# Patient Record
Sex: FEMALE | Race: OTHER | Employment: OTHER | URBAN - METROPOLITAN AREA
[De-identification: names, ages, dates, MRNs, and addresses within clinical notes are randomized per-mention and may not be internally consistent; named-entity substitution may affect disease eponyms.]

---

## 2018-05-15 ENCOUNTER — HOSPITAL ENCOUNTER (OUTPATIENT)
Dept: PHYSICAL THERAPY | Facility: HOSPITAL | Age: 33
Setting detail: THERAPIES SERIES
Discharge: HOME | End: 2018-05-15
Attending: PHYSICAL MEDICINE & REHABILITATION
Payer: MEDICARE

## 2018-05-15 DIAGNOSIS — R26.9 UNSPECIFIED ABNORMALITIES OF GAIT AND MOBILITY: ICD-10-CM

## 2018-05-15 DIAGNOSIS — G80.9 CEREBRAL PALSY (HCC): ICD-10-CM

## 2018-05-15 DIAGNOSIS — R25.2 CRAMP AND SPASM: ICD-10-CM

## 2018-05-15 PROCEDURE — 97163 PT EVAL HIGH COMPLEX 45 MIN: CPT

## 2018-05-15 PROCEDURE — 97110 THERAPEUTIC EXERCISES: CPT

## 2018-05-16 ENCOUNTER — OFFICE VISIT (OUTPATIENT)
Dept: NUTRITION | Facility: HOSPITAL | Age: 33
End: 2018-05-16
Payer: MEDICARE

## 2018-05-16 PROCEDURE — 97802 MEDICAL NUTRITION INDIV IN: CPT

## 2018-05-16 NOTE — PROGRESS NOTES
ADULT INITIAL OUTPATIENT NUTRITION CONSULTATION    Nutrition Assessment    Medical Diagnosis: Hyperlipidemia, obesity    PMH: CP  Family Hx of DM, CA, and heart disease, per pt    Physical Findings: pt in a motorized chair    Client Hx: 28year old femal today, track intake using phone charis, and contact RD with further questions or concerns. Monitoring/Evaluation:  Pt to F/U with MD. Suggest F/U with RD in 6-8 weeks.       Time spent with patient: 60 minutes    Thank you for the referral,    Beth Drummond

## 2018-05-21 ENCOUNTER — APPOINTMENT (OUTPATIENT)
Dept: PHYSICAL THERAPY | Facility: HOSPITAL | Age: 33
End: 2018-05-21
Attending: PHYSICAL MEDICINE & REHABILITATION
Payer: MEDICARE

## 2018-05-21 NOTE — PROGRESS NOTES
NEUROLOGICAL EVALUATION:   Referring Physician: Dr. Jose David Washington  Diagnosis: Cerebral Palsy    Date of Service: 5/15/2018     PATIENT SUMMARY   Karma Walden is a 28year old y/o female who presents to therapy today with complaints of stiffness and impaire years has had a decline in overall functional mobility. Pt goals include improve ability to walk, stand, improve balance, be more independent with functional mobility. Past medical history was reviewed with Tomasa Bustos.  Significant findings include CP, rhiz Supination       External Rot -5       Wrist / Hand   2- 3+ Knee         Flexion       Flexion         Extension -20      Extension -25 -5        Strength     Foot / Ankle              DF -50/-10 0     core 2/5      PF 40 50       Mobility / Transfers or a total of 12 visits over a 90 day period. Treatment will include: Neuromuscular Re-education; Therapeutic Activity; Gait Training;  Therapeutic Exercises; Manual Therapy; modalities prn, transfers, bed mobility, wheelchair mobility, and progressive HEP

## 2018-05-30 ENCOUNTER — HOSPITAL ENCOUNTER (OUTPATIENT)
Dept: PHYSICAL THERAPY | Facility: HOSPITAL | Age: 33
Setting detail: THERAPIES SERIES
Discharge: HOME | End: 2018-05-30
Attending: PHYSICAL MEDICINE & REHABILITATION
Payer: MEDICARE

## 2018-05-30 PROCEDURE — 97140 MANUAL THERAPY 1/> REGIONS: CPT

## 2018-05-30 PROCEDURE — 97112 NEUROMUSCULAR REEDUCATION: CPT

## 2018-05-30 PROCEDURE — 97110 THERAPEUTIC EXERCISES: CPT

## 2018-05-30 NOTE — PROGRESS NOTES
Dx: Cerebral Palsy; Unspecified abnormalities of gait and mobility (R26.9)  Cramp and spasm         Authorized # of Visits:  Medicare 12 visits        Next MD visit: none scheduled  Fall Risk: high        Precautions: n/a             Subjective: Pt. Reports standing/walking up to 5 minutes with AFOs and reverse walker with improved afua and mechanics. 4. Patient to improve core strength by 1/2 grade to promote improved posture and static balance.     5. Patient to perform wheel-chair transfers with modif

## 2018-06-04 ENCOUNTER — APPOINTMENT (OUTPATIENT)
Dept: PHYSICAL THERAPY | Facility: HOSPITAL | Age: 33
End: 2018-06-04
Attending: PHYSICAL MEDICINE & REHABILITATION
Payer: MEDICARE

## 2018-06-05 ENCOUNTER — HOSPITAL ENCOUNTER (OUTPATIENT)
Dept: PHYSICAL THERAPY | Facility: HOSPITAL | Age: 33
Setting detail: THERAPIES SERIES
Discharge: HOME | End: 2018-06-05
Attending: PHYSICAL MEDICINE & REHABILITATION
Payer: MEDICARE

## 2018-06-05 PROCEDURE — 97112 NEUROMUSCULAR REEDUCATION: CPT

## 2018-06-05 PROCEDURE — 97110 THERAPEUTIC EXERCISES: CPT

## 2018-06-05 PROCEDURE — 97140 MANUAL THERAPY 1/> REGIONS: CPT

## 2018-06-05 NOTE — PROGRESS NOTES
Dx: Cerebral Palsy; Unspecified abnormalities of gait and mobility (R26.9)  Cramp and spasm         Authorized # of Visits:  Medicare 12 visits        Next MD visit: none scheduled  Fall Risk: high        Precautions: n/a             Subjective: Was able to Code: Mobility: Walking and Moving Around CL: 60-79% impaired, limited, or restricted    Plan: Continue to progress with emphasis on B LE stretching and R UE stretching. Work on seated balance and bed mobility and core stabilization as tolerated.      Date:

## 2018-06-06 ENCOUNTER — APPOINTMENT (OUTPATIENT)
Dept: PHYSICAL THERAPY | Facility: HOSPITAL | Age: 33
End: 2018-06-06
Attending: PHYSICAL MEDICINE & REHABILITATION
Payer: MEDICARE

## 2018-06-07 ENCOUNTER — HOSPITAL ENCOUNTER (OUTPATIENT)
Dept: PHYSICAL THERAPY | Facility: HOSPITAL | Age: 33
Setting detail: THERAPIES SERIES
Discharge: HOME | End: 2018-06-07
Attending: PHYSICAL MEDICINE & REHABILITATION
Payer: MEDICARE

## 2018-06-07 PROCEDURE — 97140 MANUAL THERAPY 1/> REGIONS: CPT

## 2018-06-07 PROCEDURE — 97112 NEUROMUSCULAR REEDUCATION: CPT

## 2018-06-07 PROCEDURE — 97110 THERAPEUTIC EXERCISES: CPT

## 2018-06-07 NOTE — PROGRESS NOTES
Dx: Cerebral Palsy; Unspecified abnormalities of gait and mobility (R26.9)  Cramp and spasm         Authorized # of Visits:  Medicare 12 visits        Next MD visit: none scheduled  Fall Risk: high        Precautions: n/a             Subjective: Pt. Present restricted    Plan: Continue to progress with emphasis on B LE stretching and R UE stretching. Work on seated balance with catch/hitting ball back and forth and core stabilization as tolerated.      Date: 5/30/2018  Tx#: 2/12 Date: 6/5/18  Tx#: 3/12 Date: 6

## 2018-06-11 ENCOUNTER — HOSPITAL ENCOUNTER (OUTPATIENT)
Dept: PHYSICAL THERAPY | Facility: HOSPITAL | Age: 33
Setting detail: THERAPIES SERIES
Discharge: HOME | End: 2018-06-11
Attending: PHYSICAL MEDICINE & REHABILITATION
Payer: MEDICARE

## 2018-06-11 PROCEDURE — 97530 THERAPEUTIC ACTIVITIES: CPT

## 2018-06-11 PROCEDURE — 97110 THERAPEUTIC EXERCISES: CPT

## 2018-06-11 PROCEDURE — 97112 NEUROMUSCULAR REEDUCATION: CPT

## 2018-06-11 NOTE — PROGRESS NOTES
Dx: Cerebral Palsy; Unspecified abnormalities of gait and mobility (R26.9)  Cramp and spasm         Authorized # of Visits:  Medicare 12 visits        Next MD visit: none scheduled  Fall Risk: high        Precautions: n/a             Subjective: Pt reports and Moving Around CL: 60-79% impaired, limited, or restricted    Plan: Continue to progress with emphasis on B LE stretching and R UE stretching. Work on seated balance with catch/hitting ball back and forth and core stabilization as tolerated.      Date: 5 x 10  DKTC c SB x 10 LTR c SB x 10  DKTC c SB x 10 LTR c SB x 10  DKTC c SB x 10      Passive stretching B LEs Passive stretching B LEs Passive stretching B LEs Passive stretching B LEs x 8mins      Passive stretching R UE Passive stretching R UE Passive s

## 2018-06-13 ENCOUNTER — APPOINTMENT (OUTPATIENT)
Dept: PHYSICAL THERAPY | Facility: HOSPITAL | Age: 33
End: 2018-06-13
Attending: PHYSICAL MEDICINE & REHABILITATION
Payer: MEDICARE

## 2018-06-18 ENCOUNTER — APPOINTMENT (OUTPATIENT)
Dept: PHYSICAL THERAPY | Facility: HOSPITAL | Age: 33
End: 2018-06-18
Attending: PHYSICAL MEDICINE & REHABILITATION
Payer: MEDICARE

## 2018-06-19 ENCOUNTER — HOSPITAL ENCOUNTER (OUTPATIENT)
Dept: PHYSICAL THERAPY | Facility: HOSPITAL | Age: 33
Setting detail: THERAPIES SERIES
Discharge: HOME | End: 2018-06-19
Attending: PHYSICAL MEDICINE & REHABILITATION
Payer: MEDICARE

## 2018-06-19 PROCEDURE — 97112 NEUROMUSCULAR REEDUCATION: CPT

## 2018-06-19 PROCEDURE — 97140 MANUAL THERAPY 1/> REGIONS: CPT

## 2018-06-20 NOTE — PROGRESS NOTES
Dx: Cerebral Palsy; Unspecified abnormalities of gait and mobility (R26.9)  Cramp and spasm         Authorized # of Visits:  Medicare 12 visits        Next MD visit: none scheduled  Fall Risk: high        Precautions: n/a             Subjective: Pt reports 5/30/2018  Tx#: 2/12 Date: 6/5/18  Tx#: 3/12 Date: 6/7/18  Tx#: 4/12 Date: 6/11/18   Tx#: 5/12 Date: 6/20/18  Tx#: 6/12 Date: Tx#: 7/ Date:    Tx#: 8/   Gait assessment with AFOs/walker scifit L UE and L LE only x 4 min with Max A to perform 1/2 revoluati Ball squeeze 3 sec hold x10 (manual hold at R ankle) Bridge with SB x 15     Supine-sit x 4 LTR c SB x 10  DKTC c SB x 10 LTR c SB x 10  DKTC c SB x 10 LTR c SB x 10  DKTC c SB x 10 LTR c SB x 10  DKTC c SB x 10     Passive stretching B LEs Passive stretch

## 2018-06-21 ENCOUNTER — APPOINTMENT (OUTPATIENT)
Dept: PHYSICAL THERAPY | Facility: HOSPITAL | Age: 33
End: 2018-06-21
Attending: PHYSICAL MEDICINE & REHABILITATION
Payer: MEDICARE

## 2018-06-27 ENCOUNTER — APPOINTMENT (OUTPATIENT)
Dept: PHYSICAL THERAPY | Facility: HOSPITAL | Age: 33
End: 2018-06-27
Attending: PHYSICAL MEDICINE & REHABILITATION
Payer: MEDICARE

## 2018-07-03 ENCOUNTER — APPOINTMENT (OUTPATIENT)
Dept: PHYSICAL THERAPY | Facility: HOSPITAL | Age: 33
End: 2018-07-03
Attending: PHYSICAL MEDICINE & REHABILITATION
Payer: MEDICARE

## 2018-07-05 ENCOUNTER — HOSPITAL ENCOUNTER (OUTPATIENT)
Dept: PHYSICAL THERAPY | Facility: HOSPITAL | Age: 33
Setting detail: THERAPIES SERIES
Discharge: HOME | End: 2018-07-05
Attending: PHYSICAL MEDICINE & REHABILITATION
Payer: MEDICARE

## 2018-07-05 PROCEDURE — 97140 MANUAL THERAPY 1/> REGIONS: CPT

## 2018-07-05 PROCEDURE — 97110 THERAPEUTIC EXERCISES: CPT

## 2018-07-05 NOTE — PROGRESS NOTES
Dx: Cerebral Palsy; Unspecified abnormalities of gait and mobility (R26.9)  Cramp and spasm         Authorized # of Visits:  Medicare 12 visits        Next MD visit: none scheduled  Fall Risk: high        Precautions: n/a             Subjective: Pt reports stabilization as tolerated. Try seated on SB next session. Date: 5/30/2018  Tx#: 2/12 Date: 6/5/18  Tx#: 3/12 Date: 6/7/18  Tx#: 4/12 Date: 6/11/18   Tx#: 5/12 Date: 6/20/18  Tx#: 6/12 Date: 7/5/18  Tx#: 7/12 Date:    Tx#: 8/   Gait assessment with AFOs x 10      Sit-stand x 10 Bridge with ball squeeze x 1 Bridge with SB x 10 Hooklying Bridge with  Ball squeeze 3 sec hold x10 (manual hold at R ankle) Bridge with SB x 15     Supine-sit x 4 LTR c SB x 10  DKTC c SB x 10 LTR c SB x 10  DKTC c SB x 10 LTR c S

## 2018-07-10 ENCOUNTER — HOSPITAL ENCOUNTER (OUTPATIENT)
Dept: PHYSICAL THERAPY | Facility: HOSPITAL | Age: 33
Setting detail: THERAPIES SERIES
Discharge: HOME | End: 2018-07-10
Attending: PHYSICAL MEDICINE & REHABILITATION
Payer: MEDICARE

## 2018-07-10 PROCEDURE — 97110 THERAPEUTIC EXERCISES: CPT

## 2018-07-10 PROCEDURE — 97140 MANUAL THERAPY 1/> REGIONS: CPT

## 2018-07-10 NOTE — PROGRESS NOTES
Dx: Cerebral Palsy; Unspecified abnormalities of gait and mobility (R26.9)  Cramp and spasm         Authorized # of Visits:  Medicare 12 visits        Next MD visit: none scheduled  Fall Risk: high        Precautions: n/a             Subjective: Pt reports injections. Work on seated balance with catch/hitting ball back and forth and core stabilization as tolerated. Try seated on SB next session.     Date: 5/30/2018  Tx#: 2/12 Date: 6/5/18  Tx#: 3/12 Date: 6/7/18  Tx#: 4/12 Date: 6/11/18   Tx#: 5/12 Date: 6/2 10  *diagonal squeeze x 10 each Seated with ball under feet, press into ground x 10 Seated with ball under feet, press 5 sec hold into ground x 10      Sit-stand x 10 Bridge with ball squeeze x 1 Bridge with SB x 10 Hooklying Bridge with  Ball squeeze 3 se

## 2018-07-12 ENCOUNTER — HOSPITAL ENCOUNTER (OUTPATIENT)
Dept: PHYSICAL THERAPY | Facility: HOSPITAL | Age: 33
Setting detail: THERAPIES SERIES
Discharge: HOME | End: 2018-07-12
Attending: PHYSICAL MEDICINE & REHABILITATION
Payer: MEDICARE

## 2018-07-12 PROCEDURE — 97110 THERAPEUTIC EXERCISES: CPT

## 2018-07-12 PROCEDURE — 97140 MANUAL THERAPY 1/> REGIONS: CPT

## 2018-07-12 NOTE — PROGRESS NOTES
Dx: Cerebral Palsy; Unspecified abnormalities of gait and mobility (R26.9)  Cramp and spasm         Authorized # of Visits:  Medicare 12 visits        Next MD visit: none scheduled  Fall Risk: high        Precautions: n/a             Subjective: Was not abl recent botox injections. Work on seated balance with catch/hitting ball back and forth and core stabilization as tolerated. Try seated on SB next session.     Date: 5/30/2018  Tx#: 2/12 Date: 6/5/18  Tx#: 3/12 Date: 6/7/18  Tx#: 4/12 Date: 6/11/18   Tx#: 5 adduction squeeze 5 sec hold with ball x 10       hooklying with SB mass flex abdominal iso x 10  *diagonal squeeze x 10 each Seated with ball under feet, press into ground x 10 Seated with ball under feet, press 5 sec hold into ground x 10       Sit-stand

## 2018-07-17 ENCOUNTER — HOSPITAL ENCOUNTER (OUTPATIENT)
Dept: PHYSICAL THERAPY | Facility: HOSPITAL | Age: 33
Setting detail: THERAPIES SERIES
Discharge: HOME | End: 2018-07-17
Attending: PHYSICAL MEDICINE & REHABILITATION
Payer: MEDICARE

## 2018-07-17 PROCEDURE — 97110 THERAPEUTIC EXERCISES: CPT

## 2018-07-17 PROCEDURE — 97140 MANUAL THERAPY 1/> REGIONS: CPT

## 2018-07-17 NOTE — PROGRESS NOTES
Dx: Cerebral Palsy; Unspecified abnormalities of gait and mobility (R26.9)  Cramp and spasm         Authorized # of Visits:  Medicare 12 visits        Next MD visit: none scheduled  Fall Risk: high        Precautions: n/a             Subjective: Did not get Reassess next visit to send update to MD. Work on seated balance with catch/hitting ball back and forth and core stabilization as tolerated. Try seated on SB next session.     Date: 5/30/2018  Tx#: 2/12 Date: 6/5/18  Tx#: 3/12 Date: 6/7/18  Tx#: 4/12 Date: ball x 10 Seated hip adduction squeeze 5 sec hold with ball x 10 Seated on foam airex  hip adduction squeeze 5 sec hold with ball x 10        hooklying with SB mass flex abdominal iso x 10  *diagonal squeeze x 10 each Seated with ball under feet, press int

## 2018-07-19 ENCOUNTER — HOSPITAL ENCOUNTER (OUTPATIENT)
Dept: PHYSICAL THERAPY | Facility: HOSPITAL | Age: 33
Setting detail: THERAPIES SERIES
Discharge: HOME | End: 2018-07-19
Attending: PHYSICAL MEDICINE & REHABILITATION
Payer: MEDICARE

## 2018-07-19 PROCEDURE — 97140 MANUAL THERAPY 1/> REGIONS: CPT

## 2018-07-19 PROCEDURE — 97110 THERAPEUTIC EXERCISES: CPT

## 2018-07-19 NOTE — PROGRESS NOTES
Dx: Cerebral Palsy; Unspecified abnormalities of gait and mobility (R26.9)  Cramp and spasm         Authorized # of Visits:  Medicare 12 visits        Next MD visit: none scheduled  Fall Risk: high        Precautions: n/a             Subjective: Setup ortho with catch/hitting ball back and forth and core stabilization as tolerated. Try seated on SB next session.     Date: 5/30/2018  Tx#: 2/12 Date: 6/5/18  Tx#: 3/12 Date: 6/7/18  Tx#: 4/12 Date: 6/11/18   Tx#: 5/12 Date: 6/20/18  Tx#: 6/12 Date: 7/5/18  Tx#: Seated hip adduction squeeze 5 sec hold with ball x 10 Seated on foam airex  hip adduction squeeze 5 sec hold with ball x 10         hooklying with SB mass flex abdominal iso x 10  *diagonal squeeze x 10 each Seated with ball under feet, press into ground

## 2018-07-26 ENCOUNTER — HOSPITAL ENCOUNTER (OUTPATIENT)
Dept: PHYSICAL THERAPY | Facility: HOSPITAL | Age: 33
Setting detail: THERAPIES SERIES
Discharge: HOME | End: 2018-07-26
Attending: PHYSICAL MEDICINE & REHABILITATION
Payer: MEDICARE

## 2018-07-26 PROCEDURE — 97140 MANUAL THERAPY 1/> REGIONS: CPT

## 2018-07-26 PROCEDURE — 97530 THERAPEUTIC ACTIVITIES: CPT

## 2018-07-26 PROCEDURE — 97112 NEUROMUSCULAR REEDUCATION: CPT

## 2018-07-26 NOTE — PROGRESS NOTES
Dear Mc Reynolds MD, Dr. Gillian Osman    This letter is to inform you of Stormy Cordova in Physical Therapy at [facility].     Dx: cerebral palsy        Treatment # __12__ of __12__ -40/-8 0     core 2/5      PF 50 50       Mobility / Transfers Level of Assistance   Bed Mobility SBA   Supine --> Sit SBA   Sit --> Supine SBA   Sit --> Stand Performs sit-kneel to walk and transfer with SBA; unable to perform sit-stand   Stand --> Sit Pe weeks for a total of 24 visits to progress toward above unmet goals. Treatment has focused on aggressive stretching and transfer training at this point.  Pt. Will be having AFOs adjusted and seeking orthopedic consult for possible UE and LE surgery to help

## 2018-07-31 ENCOUNTER — HOSPITAL ENCOUNTER (OUTPATIENT)
Dept: PHYSICAL THERAPY | Facility: HOSPITAL | Age: 33
Setting detail: THERAPIES SERIES
Discharge: HOME | End: 2018-07-31
Attending: PHYSICAL MEDICINE & REHABILITATION
Payer: MEDICARE

## 2018-07-31 PROCEDURE — 97110 THERAPEUTIC EXERCISES: CPT

## 2018-07-31 PROCEDURE — 97140 MANUAL THERAPY 1/> REGIONS: CPT

## 2018-08-01 NOTE — PROGRESS NOTES
Dx: Cerebral Palsy; Unspecified abnormalities of gait and mobility (R26.9)  Cramp and spasm         Authorized # of Visits:  Medicare 12 visits        Next MD visit: none scheduled  Fall Risk: high        Precautions: n/a             Subjective:Has not been ball back and forth and core stabilization as tolerated. Try seated on SB next session.     Date: 5/30/2018  Tx#: 2/12 Date: 6/5/18  Tx#: 3/12 Date: 6/7/18  Tx#: 4/12 Date: 6/11/18   Tx#: 5/12 Date: 6/20/18  Tx#: 6/12 Date: 7/5/18  Tx#: 7/12 Date:  7/10/18 ball x 10 Seated hip adduction squeeze with ball x 10 Seated hip adduction squeeze 5 sec hold with ball x 10 Seated on foam airex  hip adduction squeeze 5 sec hold with ball x 10          hooklying with SB mass flex abdominal iso x 10  *diagonal squeeze x

## 2018-08-08 ENCOUNTER — APPOINTMENT (OUTPATIENT)
Dept: PHYSICAL THERAPY | Facility: HOSPITAL | Age: 33
End: 2018-08-08
Payer: MEDICARE

## 2018-08-21 ENCOUNTER — APPOINTMENT (OUTPATIENT)
Dept: PHYSICAL THERAPY | Facility: HOSPITAL | Age: 33
End: 2018-08-21
Payer: MEDICARE

## 2018-08-22 ENCOUNTER — APPOINTMENT (OUTPATIENT)
Dept: PHYSICAL THERAPY | Facility: HOSPITAL | Age: 33
End: 2018-08-22
Payer: MEDICARE

## 2018-08-28 ENCOUNTER — APPOINTMENT (OUTPATIENT)
Dept: PHYSICAL THERAPY | Facility: HOSPITAL | Age: 33
End: 2018-08-28
Payer: MEDICARE

## 2018-09-05 ENCOUNTER — HOSPITAL ENCOUNTER (OUTPATIENT)
Dept: PHYSICAL THERAPY | Facility: HOSPITAL | Age: 33
Setting detail: THERAPIES SERIES
Discharge: HOME | End: 2018-09-05
Attending: INTERNAL MEDICINE
Payer: MEDICARE

## 2018-09-05 PROCEDURE — 97140 MANUAL THERAPY 1/> REGIONS: CPT

## 2018-09-05 PROCEDURE — 97110 THERAPEUTIC EXERCISES: CPT

## 2018-09-05 NOTE — PROGRESS NOTES
Dx: Cerebral Palsy; Unspecified abnormalities of gait and mobility (R26.9)  Cramp and spasm         Authorized # of Visits:  Medicare 12 visits        Next MD visit: none scheduled  Fall Risk: high        Precautions: n/a             Subjective:Pt.  Was on v and R UE stretching with recent botox injections. Work on seated balance with catch/hitting ball back and forth and core stabilization as tolerated. Try seated on SB next session.     Date: 5/30/2018  Tx#: 2/12 Date: 6/5/18  Tx#: 3/12 Date: 6/7/18  Tx#: 4 10          Seated row red t-band x 10 Seated row red t-band x 10  Seated row red t-band x 10 (mostly LUE) Seated on foam airex red t-band row          Seated extension red band L UE x 10 Seated hip adduction squeeze with ball x 10 Seated hip adduction squ stretching R UE x2mins Passive stretching R UE Passive stretching R UE and L LE Passive stretching R UE and L LE Passive stretching R UE and L LE Passive stretching R UE and L LE Passive stretching R UE and L LE Passive stretching R UE and L LE Passive str

## 2018-09-11 ENCOUNTER — APPOINTMENT (OUTPATIENT)
Dept: PHYSICAL THERAPY | Facility: HOSPITAL | Age: 33
End: 2018-09-11
Payer: MEDICARE

## 2018-09-19 ENCOUNTER — HOSPITAL ENCOUNTER (OUTPATIENT)
Dept: PHYSICAL THERAPY | Facility: HOSPITAL | Age: 33
Setting detail: THERAPIES SERIES
Discharge: HOME | End: 2018-09-19
Attending: INTERNAL MEDICINE
Payer: MEDICARE

## 2018-09-19 PROCEDURE — 97530 THERAPEUTIC ACTIVITIES: CPT

## 2018-09-19 PROCEDURE — 97140 MANUAL THERAPY 1/> REGIONS: CPT

## 2018-09-19 PROCEDURE — 97110 THERAPEUTIC EXERCISES: CPT

## 2018-09-19 NOTE — PROGRESS NOTES
Dx: Cerebral Palsy; Unspecified abnormalities of gait and mobility (R26.9)  Cramp and spasm         Authorized # of Visits:  Medicare 12 visits        Next MD visit: none scheduled  Fall Risk: high        Precautions: n/a             Subjective:States she h 5/30/2018  Tx#: 2/12 Date: 6/5/18  Tx#: 3/12 Date: 6/7/18  Tx#: 4/12 Date: 6/11/18   Tx#: 5/12 Date: 6/20/18  Tx#: 6/12 Date: 7/5/18  Tx#: 7/12 Date:  7/10/18  Tx#: 8/12 Date: 7/12/18  Tx: 9/12 Date: 7/17/18  Tx: 10/12 Date: 7/19/18  Tx: 11/12 Date: 7/31/1 t-band x 10  Seated row red t-band x 10 (mostly LUE) Seated on foam airex red t-band row           Seated extension red band L UE x 10 Seated hip adduction squeeze with ball x 10 Seated hip adduction squeeze with ball x 10 Seated hip adduction squeeze 5 se supine, sidelying,and prone   Passive stretching R UE Passive stretching R UE Passive stretching R UE Passive stretching R UE x2mins Passive stretching R UE Passive stretching R UE and L LE Passive stretching R UE and L LE Passive stretching R UE and L LE

## 2018-10-10 ENCOUNTER — APPOINTMENT (OUTPATIENT)
Dept: PHYSICAL THERAPY | Facility: HOSPITAL | Age: 33
End: 2018-10-10
Attending: INTERNAL MEDICINE
Payer: MEDICARE

## 2018-10-16 ENCOUNTER — HOSPITAL ENCOUNTER (OUTPATIENT)
Dept: PHYSICAL THERAPY | Facility: HOSPITAL | Age: 33
Setting detail: THERAPIES SERIES
Discharge: HOME | End: 2018-10-16
Attending: INTERNAL MEDICINE
Payer: MEDICARE

## 2018-10-16 PROCEDURE — 97112 NEUROMUSCULAR REEDUCATION: CPT

## 2018-10-16 PROCEDURE — 97140 MANUAL THERAPY 1/> REGIONS: CPT

## 2018-10-17 NOTE — PROGRESS NOTES
Dx: Cerebral Palsy; Unspecified abnormalities of gait and mobility (R26.9)  Cramp and spasm         Authorized # of Visits:  Medicare 12 visits        Next MD visit: none scheduled  Fall Risk: high        Precautions: n/a             Subjective:Pt.  States s Gait assessment with AFOs/walker scifit L UE and L LE only x 4 min with Max A to perform 1/2 revoluations nustep L2 x 6 minutes nustep L2 x 5 minutes nustep L2 x 5 minutes nustep L2 x 10 minutes nustep L2 x 10 minutes nustep L2 x 10 minutes nustep L2 x 1 airex  hip adduction squeeze 5 sec hold with ball x 10            hooklying with SB mass flex abdominal iso x 10  *diagonal squeeze x 10 each Seated with ball under feet, press into ground x 10 Seated with ball under feet, press 5 sec hold into ground x 10 stretching R UE and L LE Passive stretching R UE and L LE Passive stretching R UE and L LE Passive stretching R UE and L LE   Bold exercises are part of HEP    Charges: NREED(10) MT 3 (50) Total Timed Treatment: 60 min    Total Treatment Time: 60 min

## 2018-10-18 ENCOUNTER — HOSPITAL ENCOUNTER (OUTPATIENT)
Dept: PHYSICAL THERAPY | Facility: HOSPITAL | Age: 33
Setting detail: THERAPIES SERIES
Discharge: HOME | End: 2018-10-18
Attending: INTERNAL MEDICINE
Payer: MEDICARE

## 2018-10-18 PROCEDURE — 97112 NEUROMUSCULAR REEDUCATION: CPT

## 2018-10-18 PROCEDURE — 97140 MANUAL THERAPY 1/> REGIONS: CPT

## 2018-10-18 NOTE — PROGRESS NOTES
Dx: Cerebral Palsy; Unspecified abnormalities of gait and mobility (R26.9)  Cramp and spasm         Authorized # of Visits:  Medicare 12 visits        Next MD visit: none scheduled  Fall Risk: high        Precautions: n/a             Subjective: Pt. States UE and L LE only x 4 min with Max A to perform 1/2 revoluations nustep L2 x 6 minutes nustep L2 x 5 minutes nustep L2 x 5 minutes nustep L2 x 10 minutes nustep L2 x 10 minutes nustep L2 x 10 minutes nustep L2 x 10 minutes nustep L2 x 10 min nustep L2 x 10 hip adduction squeeze 5 sec hold with ball x 10 Seated on foam airex  hip adduction squeeze 5 sec hold with ball x 10             hooklying with SB mass flex abdominal iso x 10  *diagonal squeeze x 10 each Seated with ball under feet, press into ground x 1 stretching R UE Passive stretching R UE Passive stretching R UE x2mins Passive stretching R UE Passive stretching R UE and L LE Passive stretching R UE and L LE Passive stretching R UE and L LE Passive stretching R UE and L LE Passive stretching R UE and L

## 2018-10-23 ENCOUNTER — APPOINTMENT (OUTPATIENT)
Dept: PHYSICAL THERAPY | Facility: HOSPITAL | Age: 33
End: 2018-10-23
Attending: INTERNAL MEDICINE
Payer: MEDICARE

## 2018-10-25 ENCOUNTER — APPOINTMENT (OUTPATIENT)
Dept: PHYSICAL THERAPY | Facility: HOSPITAL | Age: 33
End: 2018-10-25
Attending: INTERNAL MEDICINE
Payer: MEDICARE

## 2018-10-30 ENCOUNTER — HOSPITAL ENCOUNTER (OUTPATIENT)
Dept: PHYSICAL THERAPY | Facility: HOSPITAL | Age: 33
Setting detail: THERAPIES SERIES
Discharge: HOME | End: 2018-10-30
Attending: INTERNAL MEDICINE
Payer: MEDICARE

## 2018-10-30 PROCEDURE — 97140 MANUAL THERAPY 1/> REGIONS: CPT

## 2018-10-30 PROCEDURE — 97110 THERAPEUTIC EXERCISES: CPT

## 2018-10-30 PROCEDURE — 97112 NEUROMUSCULAR REEDUCATION: CPT

## 2018-10-30 NOTE — PROGRESS NOTES
Dear Katlin Valerio MD, Dr. Dane Chaidez    This letter is to inform you of Stormy Cordova in Physical Therapy at [facility].     Dx: cerebral palsy        Treatment # __17__ of __24__  Strength     Foot / Ankle              DF -40/-10 0     core 2/5      PF 50 50       Mobility / Transfers Level of Assistance   Bed Mobility SBA   Supine --> Sit SBA   Sit --> Supine SBA   Sit --> Stand Performs sit-kneel to walk and transfer with SBA toward above unmet goals. Treatment has focused on aggressive stretching and transfer training at this point. Pt. seeking orthopedic consult for possible UE and LE surgery to help with contracture deformities.  Pt. Will benefit from continuation of aggressi

## 2018-11-01 ENCOUNTER — HOSPITAL ENCOUNTER (OUTPATIENT)
Dept: PHYSICAL THERAPY | Facility: HOSPITAL | Age: 33
Setting detail: THERAPIES SERIES
Discharge: HOME | End: 2018-11-01
Attending: INTERNAL MEDICINE
Payer: MEDICARE

## 2018-11-01 PROCEDURE — 97112 NEUROMUSCULAR REEDUCATION: CPT

## 2018-11-01 PROCEDURE — 97140 MANUAL THERAPY 1/> REGIONS: CPT

## 2018-11-06 ENCOUNTER — APPOINTMENT (OUTPATIENT)
Dept: PHYSICAL THERAPY | Facility: HOSPITAL | Age: 33
End: 2018-11-06
Attending: INTERNAL MEDICINE
Payer: MEDICARE

## 2018-11-08 ENCOUNTER — APPOINTMENT (OUTPATIENT)
Dept: PHYSICAL THERAPY | Facility: HOSPITAL | Age: 33
End: 2018-11-08
Attending: INTERNAL MEDICINE
Payer: MEDICARE

## 2018-11-19 NOTE — PROGRESS NOTES
Dx: Cerebral Palsy; Unspecified abnormalities of gait and mobility (R26.9)  Cramp and spasm         Authorized # of Visits:  Medicare 12 visits        Next MD visit: none scheduled  Fall Risk: high        Precautions: n/a             Subjective: Has not bee 18/24   Gait assessment with AFOs/walker scifit L UE and L LE only x 4 min with Max A to perform 1/2 revoluations nustep L2 x 6 minutes nustep L2 x 5 minutes nustep L2 x 5 minutes nustep L2 x 10 minutes nustep L2 x 10 minutes nustep L2 x 10 minutes nustep t-band row             Seated extension red band L UE x 10 Seated hip adduction squeeze with ball x 10 Seated hip adduction squeeze with ball x 10 Seated hip adduction squeeze 5 sec hold with ball x 10 Seated on foam airex  hip adduction squeeze 5 sec hold aggressive stretching R LE ankle/knee in supine, sidelying,and prone MT; aggressive stretching R LE ankle/knee in supine, sidelying,and prone MT; aggressive stretching R LE  ankle/knee in supine, sidelying,and prone MT; aggressive stretching R LE  ankle/kn

## 2018-11-20 ENCOUNTER — HOSPITAL ENCOUNTER (OUTPATIENT)
Dept: PHYSICAL THERAPY | Facility: HOSPITAL | Age: 33
Setting detail: THERAPIES SERIES
Discharge: HOME | End: 2018-11-20
Attending: INTERNAL MEDICINE
Payer: MEDICARE

## 2018-11-20 PROCEDURE — 97110 THERAPEUTIC EXERCISES: CPT

## 2018-11-20 PROCEDURE — 97140 MANUAL THERAPY 1/> REGIONS: CPT

## 2018-11-27 ENCOUNTER — APPOINTMENT (OUTPATIENT)
Dept: PHYSICAL THERAPY | Facility: HOSPITAL | Age: 33
End: 2018-11-27
Payer: MEDICARE

## 2018-11-27 NOTE — PROGRESS NOTES
Dx: Cerebral Palsy; Unspecified abnormalities of gait and mobility (R26.9)  Cramp and spasm         Authorized # of Visits:  Medicare 12 visits        Next MD visit: none scheduled  Fall Risk: high        Precautions: n/a             Subjective: Was in disn 16/24 Date: 11/1/18  Tx: 18/24 Date: 11/20/18  Tx: 19/24   Gait assessment with AFOs/walker scifit L UE and L LE only x 4 min with Max A to perform 1/2 revoluations nustep L2 x 6 minutes nustep L2 x 5 minutes nustep L2 x 5 minutes nustep L2 x 10 minutes nu shoulder circles x 10              Seated row red t-band x 10 Seated row red t-band x 10  Seated row red t-band x 10 (mostly LUE) Seated on foam airex red t-band row              Seated extension red band L UE x 10 Seated hip adduction squeeze with ball x aggressive stretching R LE MT; aggressive stretching R LE ankle/knee in supine, sidelying,and prone MT; aggressive stretching R LE ankle/knee in supine, sidelying,and prone MT; aggressive stretching R LE ankle/knee in supine, sidelying,and prone MT; aggres

## 2018-11-29 ENCOUNTER — APPOINTMENT (OUTPATIENT)
Dept: PHYSICAL THERAPY | Facility: HOSPITAL | Age: 33
End: 2018-11-29
Attending: INTERNAL MEDICINE
Payer: MEDICARE

## 2018-12-12 ENCOUNTER — APPOINTMENT (OUTPATIENT)
Dept: PHYSICAL THERAPY | Facility: HOSPITAL | Age: 33
End: 2018-12-12
Attending: INTERNAL MEDICINE
Payer: MEDICARE

## 2018-12-27 ENCOUNTER — APPOINTMENT (OUTPATIENT)
Dept: PHYSICAL THERAPY | Facility: HOSPITAL | Age: 33
End: 2018-12-27
Attending: INTERNAL MEDICINE
Payer: MEDICARE

## 2019-01-14 ENCOUNTER — HOSPITAL ENCOUNTER (OUTPATIENT)
Dept: PHYSICAL THERAPY | Facility: HOSPITAL | Age: 34
Setting detail: THERAPIES SERIES
Discharge: HOME | End: 2019-01-14
Attending: INTERNAL MEDICINE
Payer: MEDICARE

## 2019-01-14 PROCEDURE — 97112 NEUROMUSCULAR REEDUCATION: CPT

## 2019-01-14 PROCEDURE — 97140 MANUAL THERAPY 1/> REGIONS: CPT

## 2019-01-14 PROCEDURE — 97110 THERAPEUTIC EXERCISES: CPT

## 2019-01-15 NOTE — PROGRESS NOTES
Dear Brent Whitman MD, Dr. Hernandez Knife    This letter is to inform you of Stormy Cordova in Physical Therapy at [facility].     Dx: cerebral palsy        Treatment # __20__ of __24__ Flexion 90/110 90/110       Extension -80/-55      Extension -20/-15 -15/-10       Pronation       IR 90        Supination       External Rot -3       Wrist / Hand   2- 3+ Knee         Flexion       Flexion 120 120       Extension Passive -10      Exten mechanics. Reviewed and issued      RECOMMENDATIONS AND PLAN OF TREATMENT  Continue PT 1-2x/wk for 8 weeks for a total of 30 visits to progress toward above unmet goals. Treatment has focused on aggressive stretching and transfer training at this point.  Pt

## 2019-01-21 ENCOUNTER — APPOINTMENT (OUTPATIENT)
Dept: PHYSICAL THERAPY | Facility: HOSPITAL | Age: 34
End: 2019-01-21
Attending: INTERNAL MEDICINE
Payer: MEDICARE

## 2022-12-18 ENCOUNTER — HOSPITAL ENCOUNTER (OUTPATIENT)
Age: 37
Discharge: HOME | End: 2022-12-18
Payer: MEDICARE

## 2022-12-18 ENCOUNTER — APPOINTMENT (OUTPATIENT)
Dept: GENERAL RADIOLOGY | Age: 37
End: 2022-12-18
Attending: PHYSICIAN ASSISTANT
Payer: MEDICARE

## 2022-12-18 VITALS
RESPIRATION RATE: 16 BRPM | DIASTOLIC BLOOD PRESSURE: 84 MMHG | BODY MASS INDEX: 29.45 KG/M2 | WEIGHT: 150 LBS | OXYGEN SATURATION: 98 % | SYSTOLIC BLOOD PRESSURE: 121 MMHG | TEMPERATURE: 97 F | HEIGHT: 60 IN | HEART RATE: 94 BPM

## 2022-12-18 DIAGNOSIS — J20.9 ACUTE BRONCHITIS, UNSPECIFIED ORGANISM: Primary | ICD-10-CM

## 2022-12-18 PROCEDURE — 71046 X-RAY EXAM CHEST 2 VIEWS: CPT | Performed by: PHYSICIAN ASSISTANT

## 2022-12-18 PROCEDURE — 99204 OFFICE O/P NEW MOD 45 MIN: CPT | Performed by: PHYSICIAN ASSISTANT

## 2022-12-18 RX ORDER — BENZONATATE 100 MG/1
100 CAPSULE ORAL 3 TIMES DAILY PRN
COMMUNITY

## 2022-12-18 RX ORDER — LORATADINE 10 MG/1
10 TABLET ORAL DAILY
COMMUNITY

## 2022-12-18 RX ORDER — ALBUTEROL SULFATE 90 UG/1
AEROSOL, METERED RESPIRATORY (INHALATION) EVERY 6 HOURS PRN
COMMUNITY

## 2022-12-18 RX ORDER — BENZONATATE 200 MG/1
200 CAPSULE ORAL 3 TIMES DAILY PRN
Qty: 30 CAPSULE | Refills: 0 | Status: SHIPPED | OUTPATIENT
Start: 2022-12-18 | End: 2022-12-28
